# Patient Record
Sex: FEMALE | Race: WHITE | NOT HISPANIC OR LATINO | Employment: FULL TIME | URBAN - METROPOLITAN AREA
[De-identification: names, ages, dates, MRNs, and addresses within clinical notes are randomized per-mention and may not be internally consistent; named-entity substitution may affect disease eponyms.]

---

## 2017-03-10 ENCOUNTER — HOSPITAL ENCOUNTER (EMERGENCY)
Facility: HOSPITAL | Age: 27
Discharge: HOME | End: 2017-03-10
Attending: EMERGENCY MEDICINE
Payer: MEDICAID

## 2017-03-10 VITALS
BODY MASS INDEX: 19.45 KG/M2 | WEIGHT: 103 LBS | HEART RATE: 69 BPM | OXYGEN SATURATION: 98 % | RESPIRATION RATE: 18 BRPM | SYSTOLIC BLOOD PRESSURE: 105 MMHG | DIASTOLIC BLOOD PRESSURE: 62 MMHG | TEMPERATURE: 98 F | HEIGHT: 61 IN

## 2017-03-10 DIAGNOSIS — N93.9 VAGINAL BLEEDING: ICD-10-CM

## 2017-03-10 DIAGNOSIS — N93.8 DUB (DYSFUNCTIONAL UTERINE BLEEDING): ICD-10-CM

## 2017-03-10 DIAGNOSIS — R10.31 RIGHT LOWER QUADRANT PAIN: Primary | ICD-10-CM

## 2017-03-10 LAB
ALBUMIN SERPL BCP-MCNC: 4.4 G/DL
ALP SERPL-CCNC: 65 U/L
ALT SERPL W/O P-5'-P-CCNC: 12 U/L
ANION GAP SERPL CALC-SCNC: 8 MMOL/L
AST SERPL-CCNC: 15 U/L
B-HCG UR QL: NEGATIVE
BACTERIA #/AREA URNS HPF: ABNORMAL /HPF
BASOPHILS # BLD AUTO: 0.01 K/UL
BASOPHILS NFR BLD: 0.3 %
BILIRUB SERPL-MCNC: 0.5 MG/DL
BILIRUB UR QL STRIP: NEGATIVE
BILIRUB UR QL STRIP: NEGATIVE
BUN SERPL-MCNC: 14 MG/DL
CALCIUM SERPL-MCNC: 9.5 MG/DL
CHLORIDE SERPL-SCNC: 105 MMOL/L
CLARITY UR: ABNORMAL
CLARITY UR: CLEAR
CO2 SERPL-SCNC: 26 MMOL/L
COLOR UR: ABNORMAL
COLOR UR: YELLOW
CREAT SERPL-MCNC: 0.7 MG/DL
CTP QC/QA: YES
DIFFERENTIAL METHOD: ABNORMAL
EOSINOPHIL # BLD AUTO: 0 K/UL
EOSINOPHIL NFR BLD: 0.9 %
ERYTHROCYTE [DISTWIDTH] IN BLOOD BY AUTOMATED COUNT: 13.9 %
EST. GFR  (AFRICAN AMERICAN): >60 ML/MIN/1.73 M^2
EST. GFR  (NON AFRICAN AMERICAN): >60 ML/MIN/1.73 M^2
GLUCOSE SERPL-MCNC: 83 MG/DL
GLUCOSE UR QL STRIP: NEGATIVE
GLUCOSE UR QL STRIP: NEGATIVE
HCT VFR BLD AUTO: 36.7 %
HGB BLD-MCNC: 12.4 G/DL
HGB UR QL STRIP: ABNORMAL
HGB UR QL STRIP: ABNORMAL
HYALINE CASTS #/AREA URNS LPF: 0 /LPF
KETONES UR QL STRIP: NEGATIVE
KETONES UR QL STRIP: NEGATIVE
LEUKOCYTE ESTERASE UR QL STRIP: ABNORMAL
LEUKOCYTE ESTERASE UR QL STRIP: NEGATIVE
LIPASE SERPL-CCNC: 22 U/L
LYMPHOCYTES # BLD AUTO: 1.5 K/UL
LYMPHOCYTES NFR BLD: 42.9 %
MCH RBC QN AUTO: 30.8 PG
MCHC RBC AUTO-ENTMCNC: 33.8 %
MCV RBC AUTO: 91 FL
MICROSCOPIC COMMENT: ABNORMAL
MICROSCOPIC COMMENT: NORMAL
MONOCYTES # BLD AUTO: 0.3 K/UL
MONOCYTES NFR BLD: 8.2 %
NEUTROPHILS # BLD AUTO: 1.7 K/UL
NEUTROPHILS NFR BLD: 47.7 %
NITRITE UR QL STRIP: NEGATIVE
NITRITE UR QL STRIP: POSITIVE
PH UR STRIP: 7 [PH] (ref 5–8)
PH UR STRIP: 7 [PH] (ref 5–8)
PLATELET # BLD AUTO: 246 K/UL
PMV BLD AUTO: 10 FL
POTASSIUM SERPL-SCNC: 3.8 MMOL/L
PROT SERPL-MCNC: 7.5 G/DL
PROT UR QL STRIP: ABNORMAL
PROT UR QL STRIP: NEGATIVE
RBC # BLD AUTO: 4.03 M/UL
RBC #/AREA URNS HPF: 1 /HPF (ref 0–4)
RBC #/AREA URNS HPF: >100 /HPF (ref 0–4)
SODIUM SERPL-SCNC: 139 MMOL/L
SP GR UR STRIP: 1.01 (ref 1–1.03)
SP GR UR STRIP: 1.02 (ref 1–1.03)
SQUAMOUS #/AREA URNS HPF: 1 /HPF
SQUAMOUS #/AREA URNS HPF: 2 /HPF
URN SPEC COLLECT METH UR: ABNORMAL
URN SPEC COLLECT METH UR: ABNORMAL
UROBILINOGEN UR STRIP-ACNC: 1 EU/DL
UROBILINOGEN UR STRIP-ACNC: NEGATIVE EU/DL
WBC # BLD AUTO: 3.52 K/UL
WBC #/AREA URNS HPF: 1 /HPF (ref 0–5)
WBC #/AREA URNS HPF: 10 /HPF (ref 0–5)

## 2017-03-10 PROCEDURE — 81000 URINALYSIS NONAUTO W/SCOPE: CPT | Mod: 91

## 2017-03-10 PROCEDURE — 96374 THER/PROPH/DIAG INJ IV PUSH: CPT

## 2017-03-10 PROCEDURE — 25000003 PHARM REV CODE 250: Performed by: NURSE PRACTITIONER

## 2017-03-10 PROCEDURE — 99284 EMERGENCY DEPT VISIT MOD MDM: CPT | Mod: 25

## 2017-03-10 PROCEDURE — 83690 ASSAY OF LIPASE: CPT

## 2017-03-10 PROCEDURE — 81025 URINE PREGNANCY TEST: CPT | Performed by: EMERGENCY MEDICINE

## 2017-03-10 PROCEDURE — 63600175 PHARM REV CODE 636 W HCPCS: Performed by: NURSE PRACTITIONER

## 2017-03-10 PROCEDURE — 96375 TX/PRO/DX INJ NEW DRUG ADDON: CPT

## 2017-03-10 PROCEDURE — 96361 HYDRATE IV INFUSION ADD-ON: CPT

## 2017-03-10 PROCEDURE — 85025 COMPLETE CBC W/AUTO DIFF WBC: CPT

## 2017-03-10 PROCEDURE — 81000 URINALYSIS NONAUTO W/SCOPE: CPT

## 2017-03-10 PROCEDURE — 80053 COMPREHEN METABOLIC PANEL: CPT

## 2017-03-10 PROCEDURE — 25500020 PHARM REV CODE 255: Performed by: EMERGENCY MEDICINE

## 2017-03-10 RX ORDER — MORPHINE SULFATE 2 MG/ML
4 INJECTION, SOLUTION INTRAMUSCULAR; INTRAVENOUS
Status: COMPLETED | OUTPATIENT
Start: 2017-03-10 | End: 2017-03-10

## 2017-03-10 RX ORDER — TRAMADOL HYDROCHLORIDE 50 MG/1
50 TABLET ORAL EVERY 6 HOURS PRN
Qty: 12 TABLET | Refills: 0 | Status: SHIPPED | OUTPATIENT
Start: 2017-03-10 | End: 2017-03-20

## 2017-03-10 RX ORDER — DROSPIRENONE AND ETHINYL ESTRADIOL 0.02-3(28)
1 KIT ORAL DAILY
COMMUNITY
End: 2019-08-08

## 2017-03-10 RX ORDER — ONDANSETRON 4 MG/1
4 TABLET, ORALLY DISINTEGRATING ORAL EVERY 8 HOURS PRN
Qty: 10 TABLET | Refills: 0 | OUTPATIENT
Start: 2017-03-10 | End: 2019-08-08

## 2017-03-10 RX ORDER — ONDANSETRON 2 MG/ML
4 INJECTION INTRAMUSCULAR; INTRAVENOUS
Status: COMPLETED | OUTPATIENT
Start: 2017-03-10 | End: 2017-03-10

## 2017-03-10 RX ADMIN — IOHEXOL 75 ML: 350 INJECTION, SOLUTION INTRAVENOUS at 11:03

## 2017-03-10 RX ADMIN — MORPHINE SULFATE 4 MG: 2 INJECTION, SOLUTION INTRAMUSCULAR; INTRAVENOUS at 10:03

## 2017-03-10 RX ADMIN — SODIUM CHLORIDE 1000 ML: 0.9 INJECTION, SOLUTION INTRAVENOUS at 10:03

## 2017-03-10 RX ADMIN — ONDANSETRON 4 MG: 2 INJECTION INTRAMUSCULAR; INTRAVENOUS at 10:03

## 2017-03-10 NOTE — ED AVS SNAPSHOT
[facility] [address]  [place] [address]               Sarwat Palumbo   3/10/2017  9:56 AM   ED    Description:  Female : 1990   Department:  [facility]           Your Care was Coordinated By:     Provider Role From To    Luis Tom MD Attending Provider 03/10/17 1001 --    MICHELLE Alfaro Nurse Practitioner 03/10/17 8124 --      Reason for Visit     Pelvic Pain           Diagnoses this Visit        Comments    Right lower quadrant pain    -  Primary     Vaginal bleeding           ED Disposition     None           To Do List           Follow-up Information     Follow up with Your GYN. Call today.       These Medications        Disp Refills Start End    tramadol (ULTRAM) 50 mg tablet 12 tablet 0 3/10/2017 3/20/2017    Take 1 tablet (50 mg total) by mouth every 6 (six) hours as needed for Pain (No driving. May be sedating). - Oral    ondansetron (ZOFRAN-ODT) 4 MG TbDL 10 tablet 0 3/10/2017     Take 1 tablet (4 mg total) by mouth every 8 (eight) hours as needed. - Oral      [facility]     [facility] -  Assistance  Registered nurses in the [facility] provide clinical advisement, health education, appointment booking, and other advisory services.  Call for this free service at [phone].             Medications           Message regarding Medications     Verify the changes and/or additions to your medication regime listed below are the same as discussed with your clinician today.  If any of these changes or additions are incorrect, please notify your healthcare provider.        START taking these NEW medications        Refills    tramadol (ULTRAM) 50 mg tablet 0    Sig: Take 1 tablet (50 mg total) by mouth every 6 (six) hours as needed for Pain (No driving. May be sedating).    Class: Print    Route: Oral    ondansetron (ZOFRAN-ODT) 4 MG TbDL 0    Sig: Take 1 tablet (4 mg  "total) by mouth every 8 (eight) hours as needed.    Class: Print    Route: Oral      These medications were administered today        Dose Freq    sodium chloride 0.9% bolus 1,000 mL 1,000 mL ED 1 Time    Sig: Inject 1,000 mLs into the vein ED 1 Time.    Class: Normal    Route: Intravenous    morphine injection 4 mg 4 mg ED 1 Time    Sig: Inject 2 mLs (4 mg total) into the vein ED 1 Time.    Class: Normal    Route: Intravenous    Cosign for Ordering: Required by Luis Tom MD    ondansetron injection 4 mg 4 mg ED 1 Time    Sig: Inject 4 mg into the vein ED 1 Time.    Class: Normal    Route: Intravenous    omnipaque 350 iohexol 75 mL 75 mL IMG once as needed    Sig: Inject 75 mLs into the vein ONCE PRN for contrast.    Class: Normal    Route: Intravenous      STOP taking these medications     ibuprofen (ADVIL,MOTRIN) 600 MG tablet Take 1 tablet (600 mg total) by mouth every 6 (six) hours as needed for Pain.    oxycodone-acetaminophen (PERCOCET) 5-325 mg per tablet Take 1 tablet by mouth every 4 (four) hours as needed for Pain.           Verify that the below list of medications is an accurate representation of the medications you are currently taking.  If none reported, the list may be blank. If incorrect, please contact your healthcare provider. Carry this list with you in case of emergency.           Current Medications     drospirenone-ethinyl estradiol (ROS) 3-0.02 mg per tablet Take 1 tablet by mouth once daily.    ondansetron (ZOFRAN-ODT) 4 MG TbDL Take 1 tablet (4 mg total) by mouth every 8 (eight) hours as needed.    tramadol (ULTRAM) 50 mg tablet Take 1 tablet (50 mg total) by mouth every 6 (six) hours as needed for Pain (No driving. May be sedating).           Clinical Reference Information           Your Vitals Were     BP Pulse Temp Resp Height Weight    101/61 (BP Location: Left arm, Patient Position: Sitting, BP Method: Automatic) 64 98.3 °F (36.8 °C) (Oral) 18 5' 1" (1.549 m) 46.7 kg (103 lb) "    SpO2 BMI             99% 19.46 kg/m2         Allergies as of 3/10/2017     No Known Allergies      Immunizations Administered on Date of Encounter - 3/10/2017     None      ED Micro, Lab, POCT     Start Ordered       Status Ordering Provider    03/10/17 1114 03/10/17 1113  Urinalysis Catheterized  STAT      Final result     03/10/17 1113 03/10/17 1113  Urinalysis Microscopic  Once      Final result     03/10/17 1024 03/10/17 1025  CBC auto differential  STAT      Final result     03/10/17 1024 03/10/17 1025  Comprehensive metabolic panel  STAT      Final result     03/10/17 1024 03/10/17 1025  Lipase  STAT      Final result     03/10/17 0908 03/10/17 0908  Urinalysis  STAT      Final result     03/10/17 0908 03/10/17 0908  POCT urine pregnancy  Once      Final result     03/10/17 0908 03/10/17 0908  Urinalysis Microscopic  Once      Final result       ED Imaging Orders     Start Ordered       Status Ordering Provider    03/10/17 1026 03/10/17 1025  CT Abdomen Pelvis With Contrast  1 time imaging      Final result         Discharge Instructions         Abdominal Pain    Abdominal pain is pain in the stomach or belly area. Everyone has this pain from time to time. In many cases it goes away on its own. But abdominal pain can sometimes be due to a serious problem, such as appendicitis. So its important to know when to seek help.  Causes of abdominal pain  There are many possible causes of abdominal pain. Common causes in adults include:  · Constipation, diarrhea, or gas  · Stomach acid flowing back up into the esophagus (acid reflux or heartburn)  · Severe acid reflux, called GERD (gastroesophageal reflux disease)  · A sore in the lining of the stomach or small intestine (peptic ulcer)  · Inflammation of the gallbladder, liver, or pancreas  · Gallstones or kidney stones  · Appendicitis   · Intestinal blockage   · An internal organ pushing through a muscle or other tissue (hernia)  · Urinary tract infections  · In  women, menstrual cramps, fibroids, or endometriosis  · Inflammation or infection of the intestines  Diagnosing the cause of abdominal pain  Your healthcare provider will do a physical exam help find the cause of your pain. If needed, tests will be ordered. Belly pain has many possible causes. So it can be hard to find the reason for your pain. Giving details about your pain can help. Tell your provider where and when you feel the pain, and what makes it better or worse. Also let your provider know if you have other symptoms such as:  · Fever  · Tiredness  · Upset stomach (nausea)  · Vomiting  · Changes in bathroom habits  Treating abdominal pain  Some causes of pain need emergency medical treatment right away. These include appendicitis or a bowel blockage. Other problems can be treated with rest, fluids, or medicines. Your healthcare provider can give you specific instructions for treatment or self-care based on what is causing your pain.  If you have vomiting or diarrhea, sip water or other clear fluids. When you are ready to eat solid foods again, start with small amounts of easy-to-digest, low-fat foods. These include apple sauce, toast, or crackers.   When to seek medical care  Call 911 or go to the hospital right away if you:  · Cant pass stool and are vomiting  · Are vomiting blood or have bloody diarrhea or black, tarry diarrhea  · Have chest, neck, or shoulder pain  · Feel like you might pass out  · Have pain in your shoulder blades with nausea  · Have sudden, severe belly pain  · Have new, severe pain unlike any you have felt before  · Have a belly that is rigid, hard, and tender to touch  Call your healthcare provider if you have:  · Pain for more than 5 days  · Bloating for more than 2 days  · Diarrhea for more than 5 days  · A fever of 100.4°F (38.0°C) or higher, or as directed by your provider  · Pain that gets worse  · Weight loss for no reason  · Continued lack of appetite  · Blood in your  stool  How to prevent abdominal pain  Here are some tips to help prevent abdominal pain:  · Eat smaller amounts of food at one time.  · Avoid greasy, fried, or other high-fat foods.  · Avoid foods that give you gas.  · Exercise regularly.  · Drink plenty of fluids.  To help prevent GERD symptoms:  · Quit smoking.  · Reduce alcohol and certain foods that increase stomach acid.  · Avoid aspirin and over-the-counter pain and fever medicines (NSAIDS or nonsteroidal anti-inflammatory drugs), if possible  · Lose extra weight.  · Finish eating at least 2 hours before you go to bed or lie down.  · Raise the head of your bed.  Date Last Reviewed: 7/1/2016  © 0027-6298 [employer]. [address] [place] [address], [place] [address]. All rights reserved. This information is not intended as a substitute for professional medical care. Always follow your healthcare professional's instructions.              Discharge References/Attachments     OVARIAN CYSTS (ENGLISH)      [facility] Sign-Up     Activating your [facility] account is as easy as 1-2-3!     1) Visit [email or web address], select Sign Up Now, enter this activation code and your date of birth, then select Next.  [email or web address]  Expires: 4/24/2017 12:48 PM      2) Create a username and password to use when you visit [facility] in the future and select a security question in case you lose your password and select Next.    3) Enter your e-mail address and click Sign Up!    Additional Information  If you have questions, please e-mail [email or web address] or call [phone] to talk to our [facility] staff. Remember, [facility] is NOT to be used for urgent needs. For medical emergencies, dial 911.          [facility] complies with applicable Federal civil rights laws and does not discriminate on the basis of race, color, national origin, age, disability, or sex.        Language Assistance Services     ATTENTION: Language assistance services are available,  free of charge. Please call [phone].      ATENCIÓN: Si habla español, tiene a carcamo disposición servicios gratuitos de asistencia lingüística. Llame al [phone].     CHÚ Ý: N?u b?n nói Ti?ng Vi?t, có các d?ch v? h? tr? ngôn ng? mi?n phí dành cho b?n. G?i s? [phone].

## 2017-03-10 NOTE — ED PROVIDER NOTES
Encounter Date: 3/10/2017       History     Chief Complaint   Patient presents with    Pelvic Pain     pelvic pain with onset of mestural cycle. pt has a hx of adhesions. last surgery 2 months ago by  at .      Review of patient's allergies indicates:  No Known Allergies  HPI Comments: 27yo female with PMhx of ovarian cysts and gynecological surgery presents for eval of right lower abd pain and vaginal bleeding.  Pt reports the pain and bleeding started yesterday.  She reports having to use 2 pads an hour since yesterday and has been passing clots.  Pt denies history of fever, trauma, vaginal discharge, STI, and being sexually active.  Denies dysuria.  Reports the pain is worst in right pelvic/RLQ area, constant, progressively worse.  Pt has vomited twice- watery.  No diarrhea or constipation.      Patient is a 26 y.o. female presenting with the following complaint: female genitourinary complaint. The history is provided by the patient.   Female  Problem   Primary symptoms include pelvic pain, vaginal bleeding.    Primary symptoms include no discharge, no fever, no genital rash, no genital itching, no genital odor, no dysuria.   This is a new problem. The current episode started yesterday. The problem occurs constantly. The problem has been gradually worsening. The symptoms occur spontaneously. She is not pregnant. She has not missed her period. LMP: Now. The patient's menstrual history has been regular. Associated symptoms include abdominal pain and vomiting. Pertinent negatives include no anorexia, no diaphoresis, no abdominal swelling, no constipation, no diarrhea, no nausea, no frequency, no light-headedness and no dizziness. She has tried nothing for the symptoms. Sexual activity: not sexually active. She uses nothing for contraception. Associated medical issues include ovarian cysts and gynecological surgery. Associated medical issues do not include STD or terminated pregnancy.     Past Medical  History:   Diagnosis Date    Ovarian cyst      Past Surgical History:   Procedure Laterality Date    CERVIX SURGERY       History reviewed. No pertinent family history.  Social History   Substance Use Topics    Smoking status: Current Every Day Smoker    Smokeless tobacco: None    Alcohol use No     Review of Systems   Constitutional: Negative for activity change, appetite change, chills, diaphoresis, fatigue and fever.   HENT: Negative for ear pain, rhinorrhea and sore throat.    Respiratory: Negative for shortness of breath.    Cardiovascular: Negative for chest pain.   Gastrointestinal: Positive for abdominal pain and vomiting. Negative for anorexia, constipation, diarrhea and nausea.   Genitourinary: Positive for pelvic pain and vaginal bleeding. Negative for decreased urine volume, difficulty urinating, dysuria, frequency, hematuria, urgency, vaginal discharge and vaginal pain.   Musculoskeletal: Negative for back pain.   Skin: Negative for rash.   Allergic/Immunologic: Negative for immunocompromised state.   Neurological: Negative for dizziness, weakness, light-headedness and headaches.   Hematological: Does not bruise/bleed easily.   Psychiatric/Behavioral: Negative for confusion.   All other systems reviewed and are negative.      Physical Exam   Initial Vitals   BP Pulse Resp Temp SpO2   03/10/17 0904 03/10/17 0904 03/10/17 0904 03/10/17 0904 03/10/17 0904   112/76 90 18 98.3 °F (36.8 °C) 99 %     Physical Exam    Nursing note and vitals reviewed.  Constitutional: Vital signs are normal. She appears well-developed and well-nourished. She is active and cooperative.  Non-toxic appearance. She does not have a sickly appearance. She does not appear ill. No distress.   HENT:   Head: Normocephalic and atraumatic.   Eyes: Conjunctivae and EOM are normal.   Neck: Normal range of motion. Neck supple.   Cardiovascular: Normal rate, regular rhythm and normal heart sounds.   Pulmonary/Chest: Effort normal and  breath sounds normal.   Abdominal: Soft. Normal appearance and bowel sounds are normal. She exhibits no distension. There is tenderness in the right lower quadrant. There is guarding. There is no rigidity, no rebound and no CVA tenderness.   Neurological: She is alert and oriented to person, place, and time. She has normal strength. GCS eye subscore is 4. GCS verbal subscore is 5. GCS motor subscore is 6.   Skin: Skin is warm, dry and intact. No rash noted. No pallor.   Psychiatric: She has a normal mood and affect. Her speech is normal and behavior is normal. Judgment and thought content normal. Cognition and memory are normal.         ED Course   Procedures  Labs Reviewed   URINALYSIS - Abnormal; Notable for the following:        Result Value    Color, UA Red (*)     Appearance, UA Cloudy (*)     Protein, UA 3+ (*)     Occult Blood UA 3+ (*)     Nitrite, UA Positive (*)     Leukocytes, UA Trace (*)     All other components within normal limits   CBC W/ AUTO DIFFERENTIAL - Abnormal; Notable for the following:     WBC 3.52 (*)     Hematocrit 36.7 (*)     Gran # 1.7 (*)     All other components within normal limits   URINALYSIS MICROSCOPIC - Abnormal; Notable for the following:     RBC, UA >100 (*)     WBC, UA 10 (*)     All other components within normal limits   URINALYSIS - Abnormal; Notable for the following:     Occult Blood UA 1+ (*)     All other components within normal limits   COMPREHENSIVE METABOLIC PANEL   LIPASE   URINALYSIS MICROSCOPIC   POCT URINE PREGNANCY         Imaging Results         CT Abdomen Pelvis With Contrast (Final result) Result time:  03/10/17 12:34:27    Final result by Rafat Rios MD (03/10/17 12:34:27)    Impression:        #1. Hepatomegaly.    #2. Otherwise unremarkable exam.      Electronically signed by: RAFAT RIOS MD  Date:     03/10/17  Time:    12:34     Narrative:    HISTORY: Abdominal pain    COMPARISON: None    FINDINGS: Axial images of the abdomen and pelvis were  obtained following the administration of 75 cc of Omni 350 intravenous contrast.  Oral contrast was not administered.    The liver is enlarged but otherwise unremarkable.  The gallbladder, spleen, adrenals, pancreas, stomach, small bowel, large bowel, appendix, kidneys, urinary bladder, and abdominal aorta are unremarkable.    No ascites or lymphadenopathy. The visualized lung bases are unremarkable. The osseous structures are unremarkable.                   Medical Decision Making:   Initial Assessment:   27yo female here for RLQ/ right pelvic pain and vaginal bleeding with clots since yesterday.   Pt has pmhx of adhesions, gyn surgery, and ovarian cysts.  Afebrile, tolerating PO.  No trauma, dysuria, vaginal discharge, constipation or diarrhea.  Pt denies history of STI and being sexually active.  Pt appears well, nontoxic.  Vitals stable.  MM moist, pink.  Abd soft, RLQ tenderness. No distention, bowel sounds normoactive.  Pt guarding RLQ.  No rebound or rigidity.  No CVA tenderness.  No rash or signs of trauma.    Differential Diagnosis:   Ovarian cyst, appendicitis, anemia, ovarian torsion, electrolyte derangement, UTI  Clinical Tests:   Lab Tests: Ordered and Reviewed  The following lab test(s) were unremarkable: CBC  Radiological Study: Ordered and Reviewed  ED Management:  Labs, IV fluids, UA (cath), CT abd/pelvis, IV morphine, IV zofran.  Pt declined pelvic US.   CT negative for acute changes. Cath UA negative for infection.  Labs unremarkable, h&H stable.  Pt reports improvement of pain.  Advised f/u with GYN within 3 days and return to the ED if condition changes, progresses, or if there are concerns.  I feel the pt may have an ovarian cyst.  Low suspicion for torsion and pt wishes to defer US at this point.  RX Ultram and Zofran ODT.  Pt verbalized understanding and compliance.               Attending Attestation:     Physician Attestation Statement for NP/PA:   I have conducted a face to face encounter  with this patient in addition to the NP/PA, due to NP/PA Request    Other NP/PA Attestation Additions:    History of Present Illness: 26-year-old female with pelvic pain.  This began with onset of her menses.  No fever or vomiting.                   ED Course     Clinical Impression:   The primary encounter diagnosis was Right lower quadrant pain. Diagnoses of Vaginal bleeding and DUB (dysfunctional uterine bleeding) were also pertinent to this visit.          MICHELLE Alfaro  03/10/17 4401       Luis Tom MD  03/10/17 4891

## 2017-03-10 NOTE — ED NOTES
Pt co RLQ abd pain for the past 2 days, pt has hx of cyst on ovaries, pt reports hx of surgery on abd for cyst last summer, RLQ abd tender to palpate, pt denies chest pain or sob, denies n/v/d, denies fever, denies dysuria, pt reports starting menstrual cycle yesterday, pt awake alert in no acute distress, Bowel sounds audible X4

## 2017-03-10 NOTE — DISCHARGE INSTRUCTIONS
Abdominal Pain    Abdominal pain is pain in the stomach or belly area. Everyone has this pain from time to time. In many cases it goes away on its own. But abdominal pain can sometimes be due to a serious problem, such as appendicitis. So its important to know when to seek help.  Causes of abdominal pain  There are many possible causes of abdominal pain. Common causes in adults include:  · Constipation, diarrhea, or gas  · Stomach acid flowing back up into the esophagus (acid reflux or heartburn)  · Severe acid reflux, called GERD (gastroesophageal reflux disease)  · A sore in the lining of the stomach or small intestine (peptic ulcer)  · Inflammation of the gallbladder, liver, or pancreas  · Gallstones or kidney stones  · Appendicitis   · Intestinal blockage   · An internal organ pushing through a muscle or other tissue (hernia)  · Urinary tract infections  · In women, menstrual cramps, fibroids, or endometriosis  · Inflammation or infection of the intestines  Diagnosing the cause of abdominal pain  Your healthcare provider will do a physical exam help find the cause of your pain. If needed, tests will be ordered. Belly pain has many possible causes. So it can be hard to find the reason for your pain. Giving details about your pain can help. Tell your provider where and when you feel the pain, and what makes it better or worse. Also let your provider know if you have other symptoms such as:  · Fever  · Tiredness  · Upset stomach (nausea)  · Vomiting  · Changes in bathroom habits  Treating abdominal pain  Some causes of pain need emergency medical treatment right away. These include appendicitis or a bowel blockage. Other problems can be treated with rest, fluids, or medicines. Your healthcare provider can give you specific instructions for treatment or self-care based on what is causing your pain.  If you have vomiting or diarrhea, sip water or other clear fluids. When you are ready to eat solid foods again,  start with small amounts of easy-to-digest, low-fat foods. These include apple sauce, toast, or crackers.   When to seek medical care  Call 911 or go to the hospital right away if you:  · Cant pass stool and are vomiting  · Are vomiting blood or have bloody diarrhea or black, tarry diarrhea  · Have chest, neck, or shoulder pain  · Feel like you might pass out  · Have pain in your shoulder blades with nausea  · Have sudden, severe belly pain  · Have new, severe pain unlike any you have felt before  · Have a belly that is rigid, hard, and tender to touch  Call your healthcare provider if you have:  · Pain for more than 5 days  · Bloating for more than 2 days  · Diarrhea for more than 5 days  · A fever of 100.4°F (38.0°C) or higher, or as directed by your provider  · Pain that gets worse  · Weight loss for no reason  · Continued lack of appetite  · Blood in your stool  How to prevent abdominal pain  Here are some tips to help prevent abdominal pain:  · Eat smaller amounts of food at one time.  · Avoid greasy, fried, or other high-fat foods.  · Avoid foods that give you gas.  · Exercise regularly.  · Drink plenty of fluids.  To help prevent GERD symptoms:  · Quit smoking.  · Reduce alcohol and certain foods that increase stomach acid.  · Avoid aspirin and over-the-counter pain and fever medicines (NSAIDS or nonsteroidal anti-inflammatory drugs), if possible  · Lose extra weight.  · Finish eating at least 2 hours before you go to bed or lie down.  · Raise the head of your bed.  Date Last Reviewed: 7/1/2016  © 0279-7891 [employer]. [address] [place] [address], [place] [address]. All rights reserved. This information is not intended as a substitute for professional medical care. Always follow your healthcare professional's instructions.

## 2018-09-24 ENCOUNTER — HOSPITAL ENCOUNTER (EMERGENCY)
Facility: HOSPITAL | Age: 28
Discharge: HOME | End: 2018-09-25
Attending: EMERGENCY MEDICINE
Payer: MEDICAID

## 2018-09-24 VITALS
HEIGHT: 61 IN | OXYGEN SATURATION: 100 % | RESPIRATION RATE: 16 BRPM | HEART RATE: 86 BPM | WEIGHT: 98 LBS | TEMPERATURE: 98 F | BODY MASS INDEX: 18.5 KG/M2

## 2018-09-24 DIAGNOSIS — N94.6 MENSES PAINFUL: Primary | ICD-10-CM

## 2018-09-24 PROCEDURE — 99283 EMERGENCY DEPT VISIT LOW MDM: CPT | Mod: ,,, | Performed by: EMERGENCY MEDICINE

## 2018-09-24 PROCEDURE — 99283 EMERGENCY DEPT VISIT LOW MDM: CPT

## 2018-09-25 LAB
B-HCG UR QL: NEGATIVE
CTP QC/QA: YES

## 2018-09-25 PROCEDURE — 81025 URINE PREGNANCY TEST: CPT | Performed by: STUDENT IN AN ORGANIZED HEALTH CARE EDUCATION/TRAINING PROGRAM

## 2018-09-25 NOTE — ED TRIAGE NOTES
"Pt reports "I'm having a really bad endometriosis flare up" that began yesterday. Reports taking 800mg ibuprofen yesterday and etodolac today but pain has not gotten better. Describes pain "someone using a blow torch throughout my abdomen" , also describes sharp pain. Endorses some SOB and nausea but states "it's from the pain"    Patient Identifiers for Sarwat Palumbo checked and correct  LOC: The patient is awake, alert and aware of environment with an appropriate affect, the patient is oriented x 3 and speaking appropriate.  APPEARANCE: Patient resting comfortably and in no acute distress, patient is clean and well groomed, patient's clothing is properly fastened.  SKIN: The skin is warm and dry, patient has normal skin turgor and moist mucus membranes,no rashes or lesions.Skin Intact , No Breakdown Noted  Musculoskeletal :  Normal range of motion noted. Moves all extremeties well, No swelling or tenderness noted  RESPIRATORY: Airway is open and patent, respirations are spontaneous, patient has a normal effort and rate.  CARDIAC: Patient has a normal rate and rhythm, no periphreal edema noted, capillary refill < 3 seconds.   ABDOMEN: Soft and non tender to palpation, no distention noted.   PULSES: 2+  And symmetrical in all extremeties  NEUROLOGIC: PERRL. facial expression is symmetrical, patient moving all extremities, normal sensation in all extremities when touched with a finger.The patient is awake, alert and cooperative with a calm affect, patient is aware of environment.    Will continue to monitor    "

## 2018-09-25 NOTE — ED PROVIDER NOTES
"Encounter Date: 9/24/2018       History     Chief Complaint   Patient presents with    Abdominal Pain     reports  pain is her  endometitrosis pain .  also c/o vaginal bleeding . denies n/v .      HPI   Pt is a 27 yo F with PMHx of endometriosis who presents to the ED complaining of abdominal pain. Pt states that she started her menstrual cycle 2 days ago and since the onset of her menstrual cycle she has had increased pain in her abdomen which has been unrelieved with her normal adjuncts of a heating pad and diet control. Pt states that she normally has increased pains during her mensrtal cylce 2/2 to endometriosis. Pt was scheduled to have follow up with her gynecologist earlier this week to discuss possible treatment alternatives however due to conflicts with her work schedule was unable to make the appointment. Pt states normally lying down and not moving around too much makes the pain better and describes the pain as a "sharp and crampy" feeling. Pt denies any CP, SOB, significant wt loss, HA, nausea, vomiting, dysuria, polyuria, back pain, LOC, fever, chills, significant blood loss, or constipation.  Review of patient's allergies indicates:  No Known Allergies  Past Medical History:   Diagnosis Date    Ovarian cyst      Past Surgical History:   Procedure Laterality Date    CERVIX SURGERY       No family history on file.  Social History     Tobacco Use    Smoking status: Current Every Day Smoker   Substance Use Topics    Alcohol use: No    Drug use: No     Review of Systems   Constitutional: Negative for fatigue and fever.   HENT: Negative for congestion and ear pain.    Eyes: Negative for photophobia and visual disturbance.   Respiratory: Negative for apnea and chest tightness.    Cardiovascular: Negative for chest pain and leg swelling.   Gastrointestinal: Positive for abdominal pain. Negative for abdominal distention, anal bleeding, blood in stool, nausea and vomiting.   Endocrine: Negative for " polydipsia and polyuria.   Genitourinary: Negative for vaginal discharge and vaginal pain.   Musculoskeletal: Negative for arthralgias and back pain.   Skin: Negative for color change.   Neurological: Negative for dizziness and headaches.       Physical Exam     Initial Vitals [09/24/18 2217]   BP Pulse Resp Temp SpO2   -- 86 16 98.3 °F (36.8 °C) 100 %      MAP       --         Physical Exam    Nursing note and vitals reviewed.  Constitutional: She appears well-developed and well-nourished. No distress.   HENT:   Head: Normocephalic and atraumatic.   Eyes: EOM are normal. Pupils are equal, round, and reactive to light.   Neck: Normal range of motion. Neck supple. No JVD present.   Cardiovascular: Normal rate and normal heart sounds. Exam reveals no friction rub.    No murmur heard.  Pulmonary/Chest: Breath sounds normal. No respiratory distress. She has no wheezes.   Abdominal: Soft. Bowel sounds are normal. She exhibits no distension and no mass. There is tenderness. There is no rebound and no guarding.   Minimal reproducible tenderness to palpation noted to umbilicus    Musculoskeletal: Normal range of motion. She exhibits no edema or tenderness.   Neurological: She is alert and oriented to person, place, and time. She has normal strength.   Psychiatric: She has a normal mood and affect.         ED Course   Procedures  Labs Reviewed - No data to display        PGY-2 MDM    Pt is a 28 y.o. female with PMHx of endometriosis who presents to the ED complaining of abdominal pain X 2 days. Pt vitals upon presentation were all wnl and pt was afebrile. On exam pt was seen sitting upright in the exam room chair in no apparent distress. She had minimal tenderness to palpation of her abdomen most notably around her umbilicus and the remainder of her exam was unremarkable. There were no palpable masses on exam. Pt's UPT was negative. Upon further interviewing with the pt she remarks that she used to take tramadol for her  "menstrual pains however had a "bad reaction" to it so no longer uses the medication. Other than using a heating pad the pt has not tried any other medication for her menstrual pains which she states typically presents this way and is amplified secondary to her endometriosis. Pt was scheduled to have a follow up appt. With her gynecologist to discuss treatment of her endometriosis and menstrual pains however the pt had missed her appointment and has yet to reschedule. Since pt has yet to try any OTC medications I counseled her on using tylenol and ibuprofen interchangeably Q4-6hrs in addition to her heating pad for symptomatic relief. I also recommended that the pt schedule an appointment with her gynecologist as soon as possible as the practitioner will be better able to manage the pt's symptoms. Pt states that she will try to recommended medication approach and monitor for symptom relief. She was agreeable to the plan as stated and told to return to the ED if her symptoms worsened. Pt is medically stable for discharge.   Vitals:    09/24/18 2217   Pulse: 86   Resp: 16   Temp: 98.3 °F (36.8 °C)         Khai Witt M.D.  [facility], PGY-2  9/25/2018 7:29 AM    Imaging Results    None                               Clinical Impression:   Menses pain                        \     Khai Witt MD  Resident  09/26/18 0642    "

## 2019-05-11 ENCOUNTER — HOSPITAL ENCOUNTER (EMERGENCY)
Facility: HOSPITAL | Age: 29
Discharge: HOME | End: 2019-05-11
Attending: EMERGENCY MEDICINE
Payer: MEDICAID

## 2019-05-11 VITALS
OXYGEN SATURATION: 98 % | BODY MASS INDEX: 20.77 KG/M2 | HEIGHT: 61 IN | SYSTOLIC BLOOD PRESSURE: 105 MMHG | HEART RATE: 85 BPM | WEIGHT: 110 LBS | TEMPERATURE: 98 F | DIASTOLIC BLOOD PRESSURE: 67 MMHG | RESPIRATION RATE: 18 BRPM

## 2019-05-11 DIAGNOSIS — S49.91XA RIGHT SHOULDER INJURY: ICD-10-CM

## 2019-05-11 LAB
B-HCG UR QL: NEGATIVE
CTP QC/QA: YES

## 2019-05-11 PROCEDURE — 99284 PR EMERGENCY DEPT VISIT,LEVEL IV: ICD-10-PCS | Mod: ,,, | Performed by: EMERGENCY MEDICINE

## 2019-05-11 PROCEDURE — 81025 URINE PREGNANCY TEST: CPT | Performed by: EMERGENCY MEDICINE

## 2019-05-11 PROCEDURE — 25000003 PHARM REV CODE 250: Performed by: EMERGENCY MEDICINE

## 2019-05-11 PROCEDURE — 99284 EMERGENCY DEPT VISIT MOD MDM: CPT | Mod: ,,, | Performed by: EMERGENCY MEDICINE

## 2019-05-11 PROCEDURE — 99283 EMERGENCY DEPT VISIT LOW MDM: CPT

## 2019-05-11 RX ORDER — NAPROXEN 500 MG/1
500 TABLET ORAL
Status: COMPLETED | OUTPATIENT
Start: 2019-05-11 | End: 2019-05-11

## 2019-05-11 RX ORDER — HYDROCODONE BITARTRATE AND ACETAMINOPHEN 5; 325 MG/1; MG/1
1 TABLET ORAL
Status: COMPLETED | OUTPATIENT
Start: 2019-05-11 | End: 2019-05-11

## 2019-05-11 RX ORDER — IBUPROFEN 600 MG/1
600 TABLET ORAL EVERY 6 HOURS PRN
Qty: 20 TABLET | Refills: 0 | OUTPATIENT
Start: 2019-05-11 | End: 2019-08-08

## 2019-05-11 RX ORDER — ONDANSETRON 8 MG/1
8 TABLET, ORALLY DISINTEGRATING ORAL
Status: COMPLETED | OUTPATIENT
Start: 2019-05-11 | End: 2019-05-11

## 2019-05-11 RX ADMIN — HYDROCODONE BITARTRATE AND ACETAMINOPHEN 1 TABLET: 5; 325 TABLET ORAL at 07:05

## 2019-05-11 RX ADMIN — NAPROXEN 500 MG: 500 TABLET ORAL at 07:05

## 2019-05-11 RX ADMIN — ONDANSETRON 8 MG: 8 TABLET, ORALLY DISINTEGRATING ORAL at 07:05

## 2019-05-11 NOTE — ED NOTES
"The patient came to the ER today with c/o right shoulder pain with possible dislocation. The patient states she has dislocated her shoulder before, and it woke up "in a lot of pain". States her right shoulder relocated twice. Good ROM right elbow. Peripheral pulses intact.   "

## 2019-05-11 NOTE — ED PROVIDER NOTES
"Encounter Date: 5/11/2019    SCRIBE #1 NOTE: I, Lionel Eden, am scribing for, and in the presence of,  Stephane Anderson MD. I have scribed the following portions of the note - Other sections scribed: HPI, ROS, PE, MDM.       History     Chief Complaint   Patient presents with    Shoulder Pain     possible dislocated shoulder     28 year old W with history of recurrent R shoulder dislocation, endometriosis presents with a chief complaint of right shoulder dislocation. Earlier this afternoon while sleeping the patient reports awakening with her shoulder dislocated. She spoke on the phone with the tele-nurse who instructed her to come to the ED. On her way down her stairs she reduced it, dislocated it again, and subsequently reduced it again.  She reports pain at that time as "nerve pain." Pain is currently mild, in shoulder, worsened by palpation and movement. No weakness or paraesthesias. Multiple previous dislocations. She has not taken anything for the pain.    The history is provided by the patient.     Review of patient's allergies indicates:  No Known Allergies  Past Medical History:   Diagnosis Date    Ovarian cyst      Past Surgical History:   Procedure Laterality Date    CERVIX SURGERY       History reviewed. No pertinent family history.  Social History     Tobacco Use    Smoking status: Current Every Day Smoker   Substance Use Topics    Alcohol use: No    Drug use: No     Review of Systems   Constitutional: Negative for chills and fever.   HENT: Negative for sore throat.    Respiratory: Negative for cough and shortness of breath.    Cardiovascular: Negative for chest pain.   Gastrointestinal: Negative for nausea and vomiting.   Genitourinary: Negative for dysuria, frequency and urgency.   Musculoskeletal: Positive for arthralgias. Negative for back pain.   Skin: Negative for rash and wound.   Neurological: Negative for syncope and weakness.   Hematological: Does not bruise/bleed easily. "   Psychiatric/Behavioral: Negative for agitation, behavioral problems and confusion.       Physical Exam     Initial Vitals [05/11/19 1755]   BP Pulse Resp Temp SpO2   105/67 100 15 98.2 °F (36.8 °C) 98 %      MAP       --         Physical Exam    Nursing note and vitals reviewed.  Constitutional: She appears well-developed and well-nourished. She is not diaphoretic. No distress.   HENT:   Head: Normocephalic and atraumatic.   Cardiovascular: Normal rate.   Peripheral pulses intact.   Pulmonary/Chest: No respiratory distress.   Musculoskeletal:        Right shoulder: She exhibits decreased range of motion, tenderness and pain. She exhibits no bony tenderness, no swelling, no effusion, no crepitus, no deformity, normal pulse and normal strength.   Good ROM of right elbow  R shoulder:  AB-duction limited to 90 degrees.  External rotation limited to 30 degrees.   Internal rotation limited to 90 degrees.   Neurological: She is alert.         ED Course   Procedures  Labs Reviewed   POCT URINE PREGNANCY          Imaging Results          X-Ray Shoulder Trauma Right (Final result)  Result time 05/11/19 19:24:04    Final result by David Muniz MD (05/11/19 19:24:04)                 Impression:      No displaced fracture-dislocation identified.      Electronically signed by: David Muniz MD  Date:    05/11/2019  Time:    19:24             Narrative:    EXAMINATION:  XR SHOULDER TRAUMA 3 VIEW RIGHT    CLINICAL HISTORY:  Unspecified injury of right shoulder and upper arm, initial encounter    TECHNIQUE:  Three or four views of the right shoulder were performed.    COMPARISON:  Chest radiograph 05/18/2009    FINDINGS:  Bones are well mineralized.  Overall alignment is within normal limits.  No displaced fracture, dislocation or destructive osseous process.  Joint spaces appear relatively maintained.  No subcutaneous emphysema or radiodense retained foreign body.  Right lung apex is clear.                                  Medical Decision Making:   Initial Assessment:   28 year old woman with history of recurrent R shoulder dislocation, endometriosis presents with a chief complaint of right shoulder dislocation.  Differential Diagnosis:   My differential diagnoses include but are not limited to: shoulder fx, shoulder dislocation, Hill-Sacks deformity, strain, sprain.  Clinical Tests:   Lab Tests: Ordered and Reviewed  Radiological Study: Ordered and Reviewed  Medical Tests: Ordered and Reviewed  ED Management:  Patient with soft compartments and is neurovascularly intact, there is no evidence of compartment syndrome. Will administer ice, NSAIDs, and a sling. Will obtain x-rays.  Reassessment:  X-ray negative for acute process. Provided with instructions for shoulder immobilization and return precautions.            Scribe Attestation:   Scribe #1: I performed the above scribed service and the documentation accurately describes the services I performed. I attest to the accuracy of the note.    Attending Attestation:           Physician Attestation for Scribe:      Comments: I, Dr. Stephane Anderson, personally performed the services described in this documentation. All medical record entries made by the scribe were at my direction and in my presence.  I have reviewed the chart and agree that the record reflects my personal performance and is accurate and complete. Stephane Anderson MD.  7:51 PM 05/11/2019                 Clinical Impression:       ICD-10-CM ICD-9-CM   1. Right shoulder injury S49.91XA 959.2                                Stephane Anderson MD  05/11/19 1951

## 2019-08-08 ENCOUNTER — HOSPITAL ENCOUNTER (EMERGENCY)
Facility: HOSPITAL | Age: 29
Discharge: HOME | End: 2019-08-08
Attending: EMERGENCY MEDICINE

## 2019-08-08 VITALS
HEART RATE: 77 BPM | DIASTOLIC BLOOD PRESSURE: 72 MMHG | BODY MASS INDEX: 19.45 KG/M2 | WEIGHT: 103 LBS | RESPIRATION RATE: 14 BRPM | HEIGHT: 61 IN | OXYGEN SATURATION: 100 % | SYSTOLIC BLOOD PRESSURE: 108 MMHG | TEMPERATURE: 99 F

## 2019-08-08 DIAGNOSIS — S43.014A ANTERIOR DISLOCATION OF SHOULDER, RIGHT, INITIAL ENCOUNTER: Primary | ICD-10-CM

## 2019-08-08 DIAGNOSIS — Q71.91: ICD-10-CM

## 2019-08-08 DIAGNOSIS — M25.519 SHOULDER PAIN: ICD-10-CM

## 2019-08-08 LAB
B-HCG UR QL: NEGATIVE
CTP QC/QA: YES

## 2019-08-08 PROCEDURE — 99285 EMERGENCY DEPT VISIT HI MDM: CPT | Mod: 25

## 2019-08-08 PROCEDURE — 96374 THER/PROPH/DIAG INJ IV PUSH: CPT

## 2019-08-08 PROCEDURE — 23650 CLTX SHO DSLC W/MNPJ WO ANES: CPT | Mod: RT

## 2019-08-08 PROCEDURE — 63600175 PHARM REV CODE 636 W HCPCS: Performed by: NURSE PRACTITIONER

## 2019-08-08 PROCEDURE — 81025 URINE PREGNANCY TEST: CPT | Performed by: NURSE PRACTITIONER

## 2019-08-08 RX ORDER — DIAZEPAM 10 MG/2ML
10 INJECTION INTRAMUSCULAR
Status: COMPLETED | OUTPATIENT
Start: 2019-08-08 | End: 2019-08-08

## 2019-08-08 RX ORDER — DIAZEPAM 10 MG/2ML
2 INJECTION INTRAMUSCULAR
Status: DISCONTINUED | OUTPATIENT
Start: 2019-08-08 | End: 2019-08-08

## 2019-08-08 RX ADMIN — DIAZEPAM 10 MG: 5 INJECTION, SOLUTION INTRAMUSCULAR; INTRAVENOUS at 07:08

## 2019-08-08 NOTE — ED NOTES
29 year old female presents to ed cc of right shoulder pain/ injury secondary to loc. Patient states has hx of right shoulder dislocating that she usually is able to put it back in place. Patient guarding right shoulder patient awake alert ox4 at this time.

## 2019-08-08 NOTE — ED PROVIDER NOTES
Encounter Date: 8/8/2019    SCRIBE #1 NOTE: I, Jazmyn Rubi, am scribing for, and in the presence of,  MICHELLE Burns. I have scribed the entire note.       History     Chief Complaint   Patient presents with    Shoulder Pain     Reports had a seizure approx 1 hr ago and believes R shoulder is dislocated. States hx of dislocation to same shoulder in the past.      29 year-old female, with history of seizure disorder, presents to the ED for R shoulder pain s/p seizure. Patient reports witnessed seizure activity one hour ago. No emergently concerning head trauma, confusion, episode of nausea/vomiting, bowel/bladder incontinence, tongue biting, or any other associated injury. Upon resolution of seizure, patient reports significant pain and deformity to the R shoulder. Patient reports history of similar symptoms in the past, consistent with recurrent R shoulder dislocation. Patient reports that she is usually able to reduce the dislocation herself, but decided to come to the ED for evaluation in favor of this. Pain is exacerbated with movement and palpation, but tolerable at rest. She denies any other complaints at this time. No numbness/tingling, joint swelling, or any other associated injury or trauma.     The history is provided by the patient.     Review of patient's allergies indicates:  No Known Allergies  Past Medical History:   Diagnosis Date    Endometriosis     Ovarian cyst     Seizures     Shoulder dislocation     R shoulder     Past Surgical History:   Procedure Laterality Date    CERVIX SURGERY       History reviewed. No pertinent family history.  Social History     Tobacco Use    Smoking status: Current Every Day Smoker   Substance Use Topics    Alcohol use: No    Drug use: No     Review of Systems   Constitutional: Negative for chills and fever.   HENT: Negative for rhinorrhea, sore throat and trouble swallowing.    Eyes: Negative for visual disturbance.   Respiratory: Negative for cough  and shortness of breath.    Cardiovascular: Negative for chest pain.   Gastrointestinal: Negative for abdominal pain, diarrhea, nausea and vomiting.   Genitourinary: Negative for dysuria and hematuria.   Musculoskeletal: Positive for arthralgias. Negative for back pain.   Skin: Negative for rash.   Neurological: Negative for weakness, numbness and headaches.   Hematological: Negative for adenopathy. Does not bruise/bleed easily.   All other systems reviewed and are negative.    Physical Exam     Initial Vitals [08/08/19 1742]   BP Pulse Resp Temp SpO2   137/88 72 20 98.2 °F (36.8 °C) 96 %      MAP       --         Physical Exam    Nursing note and vitals reviewed.  Constitutional: She appears well-developed and well-nourished.   Moderate distress related to pain   HENT:   Head: Normocephalic and atraumatic.   Mouth/Throat: Oropharynx is clear and moist.   Eyes: Conjunctivae are normal.   Cardiovascular: Normal rate, regular rhythm and intact distal pulses.   Pulses:       Radial pulses are 2+ on the right side.   Pulmonary/Chest: No respiratory distress.   Musculoskeletal:        Right shoulder: She exhibits decreased range of motion, tenderness and deformity.   Obvious deformity and dislocation of right shoulder  Distal pulses intact- no pinpoint tenderness of clavicle   Neurological: She is alert and oriented to person, place, and time.   Skin: Skin is warm and dry. Capillary refill takes less than 2 seconds. No rash noted. No erythema.   Psychiatric: She has a normal mood and affect.       ED Course   Orthopedic Injury  Date/Time: 8/8/2019 9:19 PM  Performed by: MICHELLE Barry  Authorized by: Guy J. Lefort, MD     Consent Done?:  Yes  Universal Protocol:     Verbal consent obtained?: Yes      Risks and benefits: Risks, benefits and alternatives were discussed      Consent given by:  Patient  Injury:     Injury location:  Shoulder    Location details:  Right shoulder    Injury type:  Dislocation     Dislocation type: anterior      Chronicity:  Recurrent    Hill-Sachs deformity?: No        Pre-procedure assessment:     Neurovascular status: Neurovascularly intact      Distal perfusion: normal      Neurological function: normal      Range of motion: normal      Local anesthesia used?: No        Selections made in this section will also lock the Injury type section above.:     Manipulation performed?: Yes      Reduction method:  Traction and counter traction and scapular manipulation    Reduction method:  Traction and counter traction and scapular manipulation    Reduction method:  Traction and counter traction and scapular manipulation    Reduction method:  Traction and counter traction and scapular manipulation    Reduction method:  Traction and counter traction and scapular manipulation    Reduction method:  Traction and counter traction and scapular manipulation    Reduction successful?: Yes      Confirmation: Reduction confirmed by x-ray      Complications: No      Specimens: No      Implants: No    Post-procedure assessment:     Neurovascular status: Neurovascularly intact      Distal perfusion: normal      Neurological function: normal      Patient tolerance:  Patient tolerated the procedure well with no immediate complications     Patient placed in a sling and swathe post reduction       Labs Reviewed   POCT URINE PREGNANCY          X-Rays:   Independently Interpreted Readings:   Other Readings:  Reviewed by myself, read by radiology.      Imaging Results          X-Ray Shoulder 1 View Right (Final result)  Result time 08/08/19 21:13:07   Procedure changed from X-Ray Shoulder Complete 2 View Right     Final result by Adelso Horowitz MD (08/08/19 21:13:07)                 Impression:      As above      Electronically signed by: Adelso Horowitz MD  Date:    08/08/2019  Time:    21:13             Narrative:    EXAMINATION:  XR SHOULDER 1 VIEW RIGHT    CLINICAL HISTORY:  ac joint;  Other specified  postprocedural states    COMPARISON:  08/08/2019    FINDINGS:  Single-view.    On the single view, the right humeral head appears to maintain appropriate relationship with the glenoid, positioning is improved since the previous examination.  There may be questionable elevation of the distal clavicle in relationship to the acromion, not evident on the previous exam.  There is a crescentic focus, ossific density, along the superior margin of the coracoid process.  This was not evident on the previous examination.  This may reflect sequela of degenerative change or previous injury noting acute small avulsion is not excluded, particularly given mild elevation of the distal right clavicle..  Correlation is advised.                               X-Ray Shoulder 1 View Right (Final result)  Result time 08/08/19 19:53:09    Final result by Carroll Melendrez MD (08/08/19 19:53:09)                 Impression:      Persistent anterior-inferior dislocation of the right shoulder.      Electronically signed by: Carroll Melendrez MD  Date:    08/08/2019  Time:    19:53             Narrative:    EXAMINATION:  XR SHOULDER 1 VIEW RIGHT    CLINICAL HISTORY:  Other specified postprocedural states    TECHNIQUE:  Single frontal view of the right shoulder.    COMPARISON:  08/08/2019 at 18:33 hours.    FINDINGS:  The bone mineralization is within normal limits.  No cortical step-off is identified.  There is no evidence of periostitis.    There is unchanged appearance of anterior-inferior dislocation of the right shoulder.  The acromioclavicular joint is within normal limits.  The coracoclavicular interval is unremarkable.    The visualized right lung fields are unremarkable.  There is no evidence of a pneumothorax.                               X-Ray Shoulder 1 View Right (Final result)  Result time 08/08/19 18:54:54   Procedure changed from X-Ray Shoulder Complete 2 View Right     Final result by Adelso Horowitz MD (08/08/19 18:54:54)                  Impression:      1. Anterior shoulder dislocation as above peer      Electronically signed by: Adelso Horowitz MD  Date:    08/08/2019  Time:    18:54             Narrative:    EXAMINATION:  XR SHOULDER 1 VIEW RIGHT    CLINICAL HISTORY:  DISLOCATION;  Pain in unspecified shoulder    COMPARISON:  05/11/2019    FINDINGS:  Single-view.    There is anterior inferior dislocation of the right humeral head in relationship to the glenoid.  The visualized ribs are grossly intact.  The acromioclavicular joint is intact.  The lung zones are grossly clear.                               Medical Decision Making:   Initial Assessment:   29 year-old female, with history of seizure disorder, presents to the ED for R shoulder pain s/p seizure. Patient reports witnessed seizure activity one hour ago. No emergently concerning head trauma, confusion, episode of nausea/vomiting, bowel/bladder incontinence, tongue biting, or any other associated injury. Upon resolution of seizure, patient reports significant pain and deformity to the R shoulder. Patient reports history of similar symptoms in the past, consistent with recurrent R shoulder dislocation. Patient reports that she is usually able to reduce the dislocation herself, but decided to come to the ED for evaluation in favor of this. Pain is exacerbated with movement and palpation, but tolerable at rest. She denies any other complaints at this time. No numbness/tingling, joint swelling, or any other associated injury or trauma.     Differential Diagnosis:   Anterior shoulder dislocation, posterior shoulder dislocation, AC joint separation, clavicle fracture, humeral head fracture  Clinical Tests:   Lab Tests: Ordered and Reviewed  The following lab test(s) were unremarkable: UPT  Radiological Study: Ordered and Reviewed  ED Management:  Patient examined and noted to have an obvious deformity to the right shoulder consistent with anterior shoulder dislocation.  Shoulder reduced  without complication.  Patient placed in a sling and swath and advised to follow up with Ortho.  Patient does not want any medication at discharge and advised to take ibuprofen as needed for pain. Patient advised to keep the sling and swath on for the next 72 hr.  Patient given strict return precautions and voiced understanding of all discharge instructions. Pt was stable at discharge.                        ED Course as of Aug 08 2124   Thu Aug 08, 2019   1810 Preg Test, Ur: Negative [AT]   1811 BP: 137/88 [AT]   1811 Temp: 98.2 °F (36.8 °C) [AT]   1811 Temp src: Oral [AT]   1811 Pulse: 72 [AT]   1811 Resp: 20 [AT]   1811 SpO2: 96 % [AT]   1835 X-ray revealed R anterior dislocation    [MM]      ED Course User Index  [AT] Li Lane, FNP  [MM] Jazmyn Rubi     Clinical Impression:       ICD-10-CM ICD-9-CM   1. Anterior dislocation of shoulder, right, initial encounter S43.014A 831.01   2. Shoulder pain M25.519 719.41   3. Reduction deformity of upper extremity, right Q71.91 755.20                            iL Lane, MICHELLE  08/08/19 2118       Li Lane, JAVIERP  08/08/19 2125

## 2019-08-09 NOTE — ED NOTES
Educated patient on importance to keep sling and swathe on at all times, ice injured area, and rotate tylenol motrin PRN for pain. Patient educated on ambulatory referral to ortho and follow up care; patient verbalized understanding a this time.

## 2019-08-09 NOTE — ED PROVIDER NOTES
Encounter Date: 8/8/2019       History     Chief Complaint   Patient presents with    Shoulder Pain     Reports had a seizure approx 1 hr ago and believes R shoulder is dislocated. States hx of dislocation to same shoulder in the past.      HPI  Review of patient's allergies indicates:  No Known Allergies  Past Medical History:   Diagnosis Date    Endometriosis     Ovarian cyst     Seizures     Shoulder dislocation     R shoulder     Past Surgical History:   Procedure Laterality Date    CERVIX SURGERY       History reviewed. No pertinent family history.  Social History     Tobacco Use    Smoking status: Current Every Day Smoker   Substance Use Topics    Alcohol use: No    Drug use: No     Review of Systems    Physical Exam     Initial Vitals [08/08/19 1742]   BP Pulse Resp Temp SpO2   137/88 72 20 98.2 °F (36.8 °C) 96 %      MAP       --         Physical Exam    ED Course   Procedures  Labs Reviewed   POCT URINE PREGNANCY          Imaging Results          X-Ray Shoulder 1 View Right (Final result)  Result time 08/08/19 21:13:07   Procedure changed from X-Ray Shoulder Complete 2 View Right     Final result by Adelso Horowitz MD (08/08/19 21:13:07)                 Impression:      As above      Electronically signed by: Adelso Horowitz MD  Date:    08/08/2019  Time:    21:13             Narrative:    EXAMINATION:  XR SHOULDER 1 VIEW RIGHT    CLINICAL HISTORY:  ac joint;  Other specified postprocedural states    COMPARISON:  08/08/2019    FINDINGS:  Single-view.    On the single view, the right humeral head appears to maintain appropriate relationship with the glenoid, positioning is improved since the previous examination.  There may be questionable elevation of the distal clavicle in relationship to the acromion, not evident on the previous exam.  There is a crescentic focus, ossific density, along the superior margin of the coracoid process.  This was not evident on the previous examination.  This may  reflect sequela of degenerative change or previous injury noting acute small avulsion is not excluded, particularly given mild elevation of the distal right clavicle..  Correlation is advised.                               X-Ray Shoulder 1 View Right (Final result)  Result time 08/08/19 19:53:09    Final result by Carroll Melendrez MD (08/08/19 19:53:09)                 Impression:      Persistent anterior-inferior dislocation of the right shoulder.      Electronically signed by: Carroll Melendrez MD  Date:    08/08/2019  Time:    19:53             Narrative:    EXAMINATION:  XR SHOULDER 1 VIEW RIGHT    CLINICAL HISTORY:  Other specified postprocedural states    TECHNIQUE:  Single frontal view of the right shoulder.    COMPARISON:  08/08/2019 at 18:33 hours.    FINDINGS:  The bone mineralization is within normal limits.  No cortical step-off is identified.  There is no evidence of periostitis.    There is unchanged appearance of anterior-inferior dislocation of the right shoulder.  The acromioclavicular joint is within normal limits.  The coracoclavicular interval is unremarkable.    The visualized right lung fields are unremarkable.  There is no evidence of a pneumothorax.                               X-Ray Shoulder 1 View Right (Final result)  Result time 08/08/19 18:54:54   Procedure changed from X-Ray Shoulder Complete 2 View Right     Final result by Adelso Horowitz MD (08/08/19 18:54:54)                 Impression:      1. Anterior shoulder dislocation as above peer      Electronically signed by: Adelso Horowitz MD  Date:    08/08/2019  Time:    18:54             Narrative:    EXAMINATION:  XR SHOULDER 1 VIEW RIGHT    CLINICAL HISTORY:  DISLOCATION;  Pain in unspecified shoulder    COMPARISON:  05/11/2019    FINDINGS:  Single-view.    There is anterior inferior dislocation of the right humeral head in relationship to the glenoid.  The visualized ribs are grossly intact.  The acromioclavicular joint is intact.  The  lung zones are grossly clear.                                                   ED Course as of Aug 08 2122   Thu Aug 08, 2019   1810 Preg Test, Ur: Negative [AT]   1811 BP: 137/88 [AT]   1811 Temp: 98.2 °F (36.8 °C) [AT]   1811 Temp src: Oral [AT]   1811 Pulse: 72 [AT]   1811 Resp: 20 [AT]   1811 SpO2: 96 % [AT]   1835 X-ray revealed R anterior dislocation    [MM]      ED Course User Index  [AT] Li Lane, FNP  [MM] Jazmyn uRbi     Clinical Impression:   {Add your Clinical Impression here. If you haven't documented one yet, please pend the note, finalize a Clinical Impression, and refresh your note before signing.:[id number]}

## 2019-08-09 NOTE — ED NOTES
DANNIELLE Lane at bedside to discuss x-ray results, Plan of care, and follow up with orthopedics at this time.

## 2021-12-29 ENCOUNTER — OFFICE VISIT (OUTPATIENT)
Dept: URGENT CARE | Facility: CLINIC | Age: 31
End: 2021-12-29
Payer: MEDICAID

## 2021-12-29 VITALS
HEIGHT: 61 IN | BODY MASS INDEX: 18.5 KG/M2 | RESPIRATION RATE: 16 BRPM | HEART RATE: 99 BPM | TEMPERATURE: 99 F | DIASTOLIC BLOOD PRESSURE: 82 MMHG | WEIGHT: 98 LBS | SYSTOLIC BLOOD PRESSURE: 101 MMHG | OXYGEN SATURATION: 98 %

## 2021-12-29 DIAGNOSIS — U07.1 COVID-19: Primary | ICD-10-CM

## 2021-12-29 LAB
CTP QC/QA: YES
SARS-COV-2 RDRP RESP QL NAA+PROBE: POSITIVE

## 2021-12-29 PROCEDURE — 3074F SYST BP LT 130 MM HG: CPT | Mod: CPTII,S$GLB,, | Performed by: NURSE PRACTITIONER

## 2021-12-29 PROCEDURE — 3074F PR MOST RECENT SYSTOLIC BLOOD PRESSURE < 130 MM HG: ICD-10-PCS | Mod: CPTII,S$GLB,, | Performed by: NURSE PRACTITIONER

## 2021-12-29 PROCEDURE — 99203 PR OFFICE/OUTPT VISIT, NEW, LEVL III, 30-44 MIN: ICD-10-PCS | Mod: S$GLB,,, | Performed by: NURSE PRACTITIONER

## 2021-12-29 PROCEDURE — 99203 OFFICE O/P NEW LOW 30 MIN: CPT | Mod: S$GLB,,, | Performed by: NURSE PRACTITIONER

## 2021-12-29 PROCEDURE — U0002: ICD-10-PCS | Mod: QW,S$GLB,, | Performed by: NURSE PRACTITIONER

## 2021-12-29 PROCEDURE — 3008F PR BODY MASS INDEX (BMI) DOCUMENTED: ICD-10-PCS | Mod: CPTII,S$GLB,, | Performed by: NURSE PRACTITIONER

## 2021-12-29 PROCEDURE — 1160F PR REVIEW ALL MEDS BY PRESCRIBER/CLIN PHARMACIST DOCUMENTED: ICD-10-PCS | Mod: CPTII,S$GLB,, | Performed by: NURSE PRACTITIONER

## 2021-12-29 PROCEDURE — 1160F RVW MEDS BY RX/DR IN RCRD: CPT | Mod: CPTII,S$GLB,, | Performed by: NURSE PRACTITIONER

## 2021-12-29 PROCEDURE — 3079F PR MOST RECENT DIASTOLIC BLOOD PRESSURE 80-89 MM HG: ICD-10-PCS | Mod: CPTII,S$GLB,, | Performed by: NURSE PRACTITIONER

## 2021-12-29 PROCEDURE — 1159F PR MEDICATION LIST DOCUMENTED IN MEDICAL RECORD: ICD-10-PCS | Mod: CPTII,S$GLB,, | Performed by: NURSE PRACTITIONER

## 2021-12-29 PROCEDURE — 3008F BODY MASS INDEX DOCD: CPT | Mod: CPTII,S$GLB,, | Performed by: NURSE PRACTITIONER

## 2021-12-29 PROCEDURE — 3079F DIAST BP 80-89 MM HG: CPT | Mod: CPTII,S$GLB,, | Performed by: NURSE PRACTITIONER

## 2021-12-29 PROCEDURE — 1159F MED LIST DOCD IN RCRD: CPT | Mod: CPTII,S$GLB,, | Performed by: NURSE PRACTITIONER

## 2021-12-29 PROCEDURE — U0002 COVID-19 LAB TEST NON-CDC: HCPCS | Mod: QW,S$GLB,, | Performed by: NURSE PRACTITIONER

## 2021-12-29 RX ORDER — BENZONATATE 100 MG/1
100 CAPSULE ORAL 3 TIMES DAILY PRN
Qty: 30 CAPSULE | Refills: 0 | Status: SHIPPED | OUTPATIENT
Start: 2021-12-29 | End: 2022-01-08

## 2023-05-29 ENCOUNTER — PATIENT MESSAGE (OUTPATIENT)
Dept: EMERGENCY MEDICINE | Facility: HOSPITAL | Age: 33
End: 2023-05-29

## 2023-05-29 ENCOUNTER — HOSPITAL ENCOUNTER (EMERGENCY)
Facility: HOSPITAL | Age: 33
Discharge: HOME | End: 2023-05-29
Attending: EMERGENCY MEDICINE
Payer: MEDICAID

## 2023-05-29 ENCOUNTER — TELEPHONE (OUTPATIENT)
Dept: EMERGENCY MEDICINE | Facility: HOSPITAL | Age: 33
End: 2023-05-29

## 2023-05-29 VITALS
BODY MASS INDEX: 18.03 KG/M2 | SYSTOLIC BLOOD PRESSURE: 112 MMHG | HEIGHT: 62 IN | DIASTOLIC BLOOD PRESSURE: 72 MMHG | OXYGEN SATURATION: 100 % | RESPIRATION RATE: 18 BRPM | TEMPERATURE: 98 F | HEART RATE: 81 BPM | WEIGHT: 98 LBS

## 2023-05-29 DIAGNOSIS — M25.519 SHOULDER PAIN: ICD-10-CM

## 2023-05-29 DIAGNOSIS — M89.8X1 CLAVICLE PAIN: ICD-10-CM

## 2023-05-29 DIAGNOSIS — S42.021A CLOSED DISPLACED FRACTURE OF SHAFT OF RIGHT CLAVICLE, INITIAL ENCOUNTER: Primary | ICD-10-CM

## 2023-05-29 PROCEDURE — 23650 PR CLOSED RX SHLDR DISLOCATION: ICD-10-PCS | Mod: 54,RT,, | Performed by: EMERGENCY MEDICINE

## 2023-05-29 PROCEDURE — 23650 CLTX SHO DSLC W/MNPJ WO ANES: CPT | Mod: 54,RT,, | Performed by: EMERGENCY MEDICINE

## 2023-05-29 PROCEDURE — 99284 EMERGENCY DEPT VISIT MOD MDM: CPT | Mod: 25

## 2023-05-29 PROCEDURE — 25000003 PHARM REV CODE 250: Performed by: EMERGENCY MEDICINE

## 2023-05-29 PROCEDURE — 23650 CLTX SHO DSLC W/MNPJ WO ANES: CPT | Mod: RT

## 2023-05-29 PROCEDURE — 96374 THER/PROPH/DIAG INJ IV PUSH: CPT | Mod: 59

## 2023-05-29 PROCEDURE — 63600175 PHARM REV CODE 636 W HCPCS: Performed by: EMERGENCY MEDICINE

## 2023-05-29 PROCEDURE — 99284 EMERGENCY DEPT VISIT MOD MDM: CPT | Mod: 25,57,, | Performed by: EMERGENCY MEDICINE

## 2023-05-29 PROCEDURE — 99284 PR EMERGENCY DEPT VISIT,LEVEL IV: ICD-10-PCS | Mod: 25,57,, | Performed by: EMERGENCY MEDICINE

## 2023-05-29 RX ORDER — MORPHINE SULFATE 4 MG/ML
4 INJECTION, SOLUTION INTRAMUSCULAR; INTRAVENOUS
Status: COMPLETED | OUTPATIENT
Start: 2023-05-29 | End: 2023-05-29

## 2023-05-29 RX ORDER — LIDOCAINE HYDROCHLORIDE 10 MG/ML
5 INJECTION, SOLUTION EPIDURAL; INFILTRATION; INTRACAUDAL; PERINEURAL
Status: COMPLETED | OUTPATIENT
Start: 2023-05-29 | End: 2023-05-29

## 2023-05-29 RX ORDER — IBUPROFEN 400 MG/1
400 TABLET ORAL EVERY 6 HOURS PRN
Qty: 20 TABLET | Refills: 0 | Status: SHIPPED | OUTPATIENT
Start: 2023-05-29

## 2023-05-29 RX ORDER — IBUPROFEN 400 MG/1
400 TABLET ORAL EVERY 6 HOURS PRN
Qty: 20 TABLET | Refills: 0 | Status: SHIPPED | OUTPATIENT
Start: 2023-05-29 | End: 2023-05-29 | Stop reason: CLARIF

## 2023-05-29 RX ORDER — OXYCODONE AND ACETAMINOPHEN 5; 325 MG/1; MG/1
1 TABLET ORAL EVERY 6 HOURS PRN
Qty: 10 TABLET | Refills: 0 | Status: SHIPPED | OUTPATIENT
Start: 2023-05-29 | End: 2023-06-09 | Stop reason: ALTCHOICE

## 2023-05-29 RX ORDER — OXYCODONE AND ACETAMINOPHEN 5; 325 MG/1; MG/1
1 TABLET ORAL EVERY 6 HOURS PRN
Qty: 8 TABLET | Refills: 0 | Status: SHIPPED | OUTPATIENT
Start: 2023-05-29 | End: 2023-05-29 | Stop reason: CLARIF

## 2023-05-29 RX ORDER — LIDOCAINE HYDROCHLORIDE 10 MG/ML
INJECTION INFILTRATION; PERINEURAL
Status: DISCONTINUED
Start: 2023-05-29 | End: 2023-05-29 | Stop reason: HOSPADM

## 2023-05-29 RX ORDER — OXYCODONE AND ACETAMINOPHEN 5; 325 MG/1; MG/1
1 TABLET ORAL
Status: COMPLETED | OUTPATIENT
Start: 2023-05-29 | End: 2023-05-29

## 2023-05-29 RX ADMIN — MORPHINE SULFATE 4 MG: 4 INJECTION INTRAVENOUS at 06:05

## 2023-05-29 RX ADMIN — LIDOCAINE HYDROCHLORIDE 50 MG: 10 INJECTION, SOLUTION EPIDURAL; INFILTRATION; INTRACAUDAL at 06:05

## 2023-05-29 RX ADMIN — OXYCODONE HYDROCHLORIDE AND ACETAMINOPHEN 1 TABLET: 5; 325 TABLET ORAL at 08:05

## 2023-05-29 NOTE — ED TRIAGE NOTES
Sarwat Palumbo, a 33 y.o. female presents to the ED w/ complaint of R shoulder pain that woke her from her sleep. States frequently dislocates shoulder but usually able to pop back in herself. Reports feels the same as previous dislocations but pain is worse. -injury or trauma to site. Deformity noted to R shoulder. Denies numbness or tingling to extremity. +peripheral pulses.     Triage note:  Chief Complaint   Patient presents with    Shoulder Pain     R shoulder pain, deformity noted, reports hx of dislocations to same joint. Denies injury/trauma, states she was sleeping.      Review of patient's allergies indicates:  No Known Allergies  Past Medical History:   Diagnosis Date    Endometriosis     Ovarian cyst     Seizures     Shoulder dislocation     R shoulder

## 2023-05-29 NOTE — ED PROVIDER NOTES
Encounter Date: 5/29/2023       History     Chief Complaint   Patient presents with    Shoulder Pain     R shoulder pain, deformity noted, reports hx of dislocations to same joint. Denies injury/trauma, states she was sleeping.      33-year-old past medical history of multiple right shoulder dislocations, seizures, endometriosis, ovarian cyst presenting with right shoulder pain.  Patient mentions over awakening from sleep this morning having severe pain in right shoulder.  Patient denies any history of recent trauma, falls denies any numbness, tingling or weakness mentions pain is now 8/10 radiating to right neck.  Patient mentions sensation exactly like prior multiple right shoulder dislocations.    Review of patient's allergies indicates:  No Known Allergies  Past Medical History:   Diagnosis Date    Endometriosis     Ovarian cyst     Seizures     Shoulder dislocation     R shoulder     Past Surgical History:   Procedure Laterality Date    CERVIX SURGERY       History reviewed. No pertinent family history.  Social History     Tobacco Use    Smoking status: Every Day    Smokeless tobacco: Never   Substance Use Topics    Alcohol use: No    Drug use: No     Review of Systems    Physical Exam     Initial Vitals [05/29/23 0613]   BP Pulse Resp Temp SpO2   110/78 86 20 97.6 °F (36.4 °C) 100 %      MAP       --         Physical Exam    Constitutional: She appears well-developed and well-nourished.   HENT:   Head: Normocephalic and atraumatic.   Eyes: Conjunctivae, EOM and lids are normal. Pupils are equal, round, and reactive to light.   Neck: Trachea normal.   Normal range of motion.   Full passive range of motion without pain.     Musculoskeletal:         General: Tenderness present.      Cervical back: Full passive range of motion without pain and normal range of motion.      Comments: Tenderness palpation at right shoulder patient holding in external rotation.  Patient able to give thumbs up, pincer grasp, abduct  fingers without difficulty strength 5/5 throughout sensation grossly intact to light touch pulse radial pulse 2 +full decreased range of motion of right shoulder due to pain.    Noted deformity of right clavicle and tenderness to palpation at mid shaft.     Neurological: She is alert and oriented to person, place, and time. She has normal strength. GCS eye subscore is 4. GCS verbal subscore is 5. GCS motor subscore is 6.   Skin: Skin is intact.   Psychiatric: She has a normal mood and affect. Her speech is normal and behavior is normal. Judgment and thought content normal.       ED Course   Orthopedic Injury    Date/Time: 2023 10:19 AM  Performed by: Junior Moody Jr., MD  Authorized by: Junior Moody Jr., MD     Location procedure was performed:  [facility] EMERGENCY DEPARTMENT  Consent Done?:  Yes  Universal Protocol:     Verbal consent obtained?: Yes      Risks and benefits: Risks, benefits and alternatives were discussed      Patient identity confirmed:  , MRN and name  Injury:     Injury location:  Shoulder    Location details:  Right shoulder    Injury type:  Dislocation    Dislocation type: anterior      Chronicity:  Recurrent    Hill-Sachs deformity?: No        Pre-procedure assessment:     Neurovascular status: Neurovascularly intact      Local anesthesia used?: Yes      Anesthesia:  Local infiltration    Local anesthetic:  Lidocaine 1% without epinephrine    Anesthetic total (ml):  8    Patient sedated?: No        Selections made in this section will also lock the Injury type section above.:     Manipulation performed?: Yes      Reduction method:  Rosalio maneuver    Reduction method:  Rosalio maneuver    Reduction method:  Rosalio maneuver    Reduction method:  Rosalio maneuver    Reduction method:  Rosalio maneuver    Reduction method:  Rosalio maneuver    Reduction successful?: Yes      Immobilization:  Sling    Complications: No      Specimens: No      Implants: No    Post-procedure  assessment:     Neurovascular status: Neurovascularly intact      Patient tolerance:  Patient tolerated the procedure well with no immediate complications  Labs Reviewed - No data to display         Imaging Results              X-Ray Clavicle Right (Final result)  Result time 05/29/23 09:24:42      Final result by Alessandro Esteban Jr., MD (05/29/23 09:24:42)                   Impression:      See above      Electronically signed by: Alessandro Pace Jr  Date:    05/29/2023  Time:    09:24               Narrative:    EXAMINATION:  XR CLAVICLE RIGHT    CLINICAL HISTORY:  Other specified disorders of bone, shoulder    TECHNIQUE:  XR CLAVICLE RIGHT    COMPARISON:  None    FINDINGS:  There is a comminuted and displaced fracture of the midshaft of the clavicle with significant inferior displaced of the distal fracture fragments and a vertically oriented shard of cortical bone interspersed into the fracture gap.  The proximal clavicle fracture fragment nearly protrudes through the skin.  The remainder of the regional osseous structures are unremarkable.                                       X-Ray Shoulder Complete 2 View Right (Final result)  Result time 05/29/23 07:53:43      Final result by Jonas Daley MD (05/29/23 07:53:43)                   Impression:      Recent fracture right clavicle.  Subluxation of the humeral head at the glenoid, almost dislocated.      Electronically signed by: Jonas Daley MD  Date:    05/29/2023  Time:    07:53               Narrative:    EXAMINATION:  XR SHOULDER COMPLETE 2 OR MORE VIEWS RIGHT    CLINICAL HISTORY:  Pain in unspecified shoulder    TECHNIQUE:  Two or three views of the right shoulder were performed.    COMPARISON:  08/08/2019    FINDINGS:  Right clavicle has a recent appearing fracture near the junction of the middle and distal thirds with about 1 cm inferior displacement of the distal fragment.  No other acute fracture is detected.  AC joint looks intact.   Old exams demonstrated a right shoulder dislocation.  The humeral head is currently subluxed inferiorly and anteriorly in relation to the glenoid, almost dislocated.  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    Visualized right ribs look intact.                                       Medications   morphine injection 4 mg (4 mg Intravenous Given 5/29/23 0639)   LIDOcaine (PF) 10 mg/ml (1%) injection 50 mg (50 mg Infiltration Given by Provider 5/29/23 0639)   oxyCODONE-acetaminophen 5-325 mg per tablet 1 tablet (1 tablet Oral Given 5/29/23 0801)     Medical Decision Making:   History:   Old Medical Records: I decided to obtain old medical records.  Initial Assessment:   33-year-old presenting with right shoulder pain.  Differential Diagnosis:   DDx includes but not limited to:   Fracture, dislocation, contusion, muscular strain, muscular sprain.       Clinical Tests:   Radiological Study: Ordered and Reviewed  ED Management:  Plan:  Obtain x-rays reassess.    X-ray noted for subluxation and possible right shoulder dislocation.  Attempted right intraventricular lidocaine injection patient mentions feeling mild improvement in pain attempted steps in reduction patient mentions feeling improved range of motion of right shoulder however still having pain and right clavicle x-ray showed it mid shaft comminuted and dislocated right clavicle.  Patient remains neurovascularly intact will DC home with sling follow-up with orthopedics.  Patient safe for plan discharge home at this time.           ED Course as of 05/29/23 1021   Mon May 29, 2023       0816 Attempted bedside intra-articular lidocaine injection with mild improvement in shoulder pain given p.o. analgesia attempting keely's reduction will continue to reassess. [DC]      ED Course User Index  [DC] Junior Moody Jr., MD                 Clinical Impression:   Final diagnoses:  [M25.519] Shoulder pain  [M25.519]  Shoulder pain - r/o dislocation  [M89.8X1] Clavicle pain  [S42.021A] Closed displaced fracture of shaft of right clavicle, initial encounter (Primary)        ED Disposition Condition    Discharge Stable          ED Prescriptions       Medication Sig Dispense Start Date End Date Auth. Provider    oxyCODONE-acetaminophen (PERCOCET) 5-325 mg per tablet () Take 1 tablet by mouth every 6 (six) hours as needed for Pain. 8 tablet 2023 Junior Moody Jr., MD    ibuprofen (ADVIL,MOTRIN) 400 MG tablet () Take 1 tablet (400 mg total) by mouth every 6 (six) hours as needed. 20 tablet 2023 Junior Moody Jr., MD          Follow-up Information       Follow up With Specialties Details Why Contact Info    Renetta Marie MD Obstetrics In 1 week  [address]  [address]  [place] [facility] [address]  [phone]      [facility] - Emergency Dept Emergency Medicine   [address] [address] [phone]             Junior Moody Jr., MD  23 [phone]
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
## 2023-06-09 RX ORDER — ONDANSETRON 4 MG/1
4 TABLET, FILM COATED ORAL EVERY 6 HOURS
Qty: 12 TABLET | Refills: 0 | Status: SHIPPED | OUTPATIENT
Start: 2023-06-09

## 2023-06-09 RX ORDER — TRAMADOL HYDROCHLORIDE 50 MG/1
50 TABLET ORAL EVERY 6 HOURS PRN
Qty: 10 TABLET | Refills: 0 | Status: SHIPPED | OUTPATIENT
Start: 2023-06-09